# Patient Record
Sex: FEMALE | Race: WHITE | NOT HISPANIC OR LATINO | Employment: OTHER | URBAN - NONMETROPOLITAN AREA
[De-identification: names, ages, dates, MRNs, and addresses within clinical notes are randomized per-mention and may not be internally consistent; named-entity substitution may affect disease eponyms.]

---

## 2018-08-15 RX ORDER — MULTIPLE VITAMINS W/ MINERALS TAB 9MG-400MCG
1 TAB ORAL DAILY
COMMUNITY
End: 2018-08-16

## 2018-08-15 RX ORDER — LANSOPRAZOLE 30 MG/1
30 CAPSULE, DELAYED RELEASE ORAL 2 TIMES DAILY
COMMUNITY
End: 2018-08-16 | Stop reason: ALTCHOICE

## 2018-08-15 RX ORDER — ROSUVASTATIN CALCIUM 20 MG/1
20 TABLET, COATED ORAL DAILY
COMMUNITY

## 2018-08-16 ENCOUNTER — APPOINTMENT (OUTPATIENT)
Dept: LAB | Facility: HOSPITAL | Age: 63
End: 2018-08-16

## 2018-08-16 ENCOUNTER — OFFICE VISIT (OUTPATIENT)
Dept: GASTROENTEROLOGY | Facility: CLINIC | Age: 63
End: 2018-08-16

## 2018-08-16 ENCOUNTER — TELEPHONE (OUTPATIENT)
Dept: GASTROENTEROLOGY | Facility: CLINIC | Age: 63
End: 2018-08-16

## 2018-08-16 VITALS
SYSTOLIC BLOOD PRESSURE: 120 MMHG | OXYGEN SATURATION: 99 % | TEMPERATURE: 97.5 F | HEIGHT: 60 IN | WEIGHT: 143 LBS | HEART RATE: 69 BPM | DIASTOLIC BLOOD PRESSURE: 82 MMHG | BODY MASS INDEX: 28.07 KG/M2

## 2018-08-16 DIAGNOSIS — R10.13 EPIGASTRIC PAIN: Primary | ICD-10-CM

## 2018-08-16 DIAGNOSIS — Z87.19 HISTORY OF PANCREATITIS: ICD-10-CM

## 2018-08-16 LAB
ALBUMIN SERPL-MCNC: 4.3 G/DL (ref 3.5–5)
ALBUMIN/GLOB SERPL: 1.5 G/DL (ref 1.1–2.5)
ALP SERPL-CCNC: 68 U/L (ref 24–120)
ALT SERPL W P-5'-P-CCNC: 40 U/L (ref 0–54)
ANION GAP SERPL CALCULATED.3IONS-SCNC: 8 MMOL/L (ref 4–13)
AST SERPL-CCNC: 28 U/L (ref 7–45)
BASOPHILS # BLD AUTO: 0.04 10*3/MM3 (ref 0–0.2)
BASOPHILS NFR BLD AUTO: 0.5 % (ref 0–2)
BILIRUB SERPL-MCNC: 0.5 MG/DL (ref 0.1–1)
BUN BLD-MCNC: 20 MG/DL (ref 5–21)
BUN/CREAT SERPL: 28.2 (ref 7–25)
CALCIUM SPEC-SCNC: 9.4 MG/DL (ref 8.4–10.4)
CHLORIDE SERPL-SCNC: 104 MMOL/L (ref 98–110)
CO2 SERPL-SCNC: 30 MMOL/L (ref 24–31)
CREAT BLD-MCNC: 0.71 MG/DL (ref 0.5–1.4)
CRP SERPL-MCNC: <0.5 MG/DL (ref 0–0.99)
DEPRECATED RDW RBC AUTO: 42.4 FL (ref 40–54)
EOSINOPHIL # BLD AUTO: 0.16 10*3/MM3 (ref 0–0.7)
EOSINOPHIL NFR BLD AUTO: 2 % (ref 0–4)
ERYTHROCYTE [DISTWIDTH] IN BLOOD BY AUTOMATED COUNT: 12.9 % (ref 12–15)
GFR SERPL CREATININE-BSD FRML MDRD: 83 ML/MIN/1.73
GLOBULIN UR ELPH-MCNC: 2.8 GM/DL
GLUCOSE BLD-MCNC: 91 MG/DL (ref 70–100)
HCT VFR BLD AUTO: 42.4 % (ref 37–47)
HGB BLD-MCNC: 14.8 G/DL (ref 12–16)
IMM GRANULOCYTES # BLD: 0.04 10*3/MM3 (ref 0–0.03)
IMM GRANULOCYTES NFR BLD: 0.5 % (ref 0–5)
LIPASE SERPL-CCNC: 137 U/L (ref 23–203)
LYMPHOCYTES # BLD AUTO: 1.6 10*3/MM3 (ref 0.72–4.86)
LYMPHOCYTES NFR BLD AUTO: 20.1 % (ref 15–45)
MCH RBC QN AUTO: 31.8 PG (ref 28–32)
MCHC RBC AUTO-ENTMCNC: 34.9 G/DL (ref 33–36)
MCV RBC AUTO: 91.2 FL (ref 82–98)
MONOCYTES # BLD AUTO: 0.52 10*3/MM3 (ref 0.19–1.3)
MONOCYTES NFR BLD AUTO: 6.5 % (ref 4–12)
NEUTROPHILS # BLD AUTO: 5.62 10*3/MM3 (ref 1.87–8.4)
NEUTROPHILS NFR BLD AUTO: 70.4 % (ref 39–78)
NRBC BLD MANUAL-RTO: 0 /100 WBC (ref 0–0)
PLATELET # BLD AUTO: 179 10*3/MM3 (ref 130–400)
PMV BLD AUTO: 10.2 FL (ref 6–12)
POTASSIUM BLD-SCNC: 4.3 MMOL/L (ref 3.5–5.3)
PROT SERPL-MCNC: 7.1 G/DL (ref 6.3–8.7)
RBC # BLD AUTO: 4.65 10*6/MM3 (ref 4.2–5.4)
SODIUM BLD-SCNC: 142 MMOL/L (ref 135–145)
WBC NRBC COR # BLD: 7.98 10*3/MM3 (ref 4.8–10.8)

## 2018-08-16 PROCEDURE — 83690 ASSAY OF LIPASE: CPT | Performed by: NURSE PRACTITIONER

## 2018-08-16 PROCEDURE — 36415 COLL VENOUS BLD VENIPUNCTURE: CPT | Performed by: NURSE PRACTITIONER

## 2018-08-16 PROCEDURE — 86140 C-REACTIVE PROTEIN: CPT | Performed by: NURSE PRACTITIONER

## 2018-08-16 PROCEDURE — 80053 COMPREHEN METABOLIC PANEL: CPT | Performed by: NURSE PRACTITIONER

## 2018-08-16 PROCEDURE — 85025 COMPLETE CBC W/AUTO DIFF WBC: CPT | Performed by: NURSE PRACTITIONER

## 2018-08-16 PROCEDURE — 99214 OFFICE O/P EST MOD 30 MIN: CPT | Performed by: NURSE PRACTITIONER

## 2018-08-16 RX ORDER — PHENOL 1.4 %
AEROSOL, SPRAY (ML) MUCOUS MEMBRANE AS NEEDED
COMMUNITY

## 2018-08-16 RX ORDER — OMEPRAZOLE 20 MG/1
20 CAPSULE, DELAYED RELEASE ORAL DAILY
COMMUNITY
End: 2023-02-06 | Stop reason: ALTCHOICE

## 2018-08-16 NOTE — PROGRESS NOTES
"[facility] GASTROENTEROLOGY - OFFICE NOTE    2018    Elva Gorman   1955    Primary Physician: Natali Kenyon MD    Chief Complaint   Patient presents with   • Abdominal Pain         HISTORY OF PRESENT ILLNESS    Elva Gorman is a 63 y.o. female presents  with abdominal pain intermittent x 2 yrs.  Has been constant for 2 weeks.   Located bobby area. Described as a tightness. Radiates to back.  Worse in am's and at night. Can wake up during the night with the pain. Can occur anytime not specifically after eating. Not sure if same pain as with pancreatitis 8 yrs ago.   No n/v. No fever or chills/  No weight loss. Appetite is good. The pain in not exertional. Takes omeprazole daily. Has taken ppi daily for few years. No asa. If has bad headache will take ibuprofen once per month. Has bm daily. No rectal bleeding. \" no more stress than usual\". Steroid injection right shoulder 2 weeks ago. No imaging studies of abdomen.     She had pancreatitis around 8 yr ago and was sent to [facility] for ercp. She was diagnosed with pancreatic divisum.        Last colonoscopy 10/2015  Normal. Recall rec 5 yr.   Last egd 10/2013 normal except gastritis,  Urea neg .    Past Medical History:   Diagnosis Date   • Abnormal liver enzymes    • Arthritis    • Colon polyp    • Common bile duct (CBD) stricture    • Depression    • Elevated CA 19-9 level    • GERD (gastroesophageal reflux disease)    • Hyperlipidemia    • Hypertension    • IBS (irritable bowel syndrome)    • Nephrolithiasis    • Osteopenia    • Pancreatic divisum        Past Surgical History:   Procedure Laterality Date   • APPENDECTOMY     •  SECTION     • CHOLECYSTECTOMY     • COLONOSCOPY  10/15/2015    normal   • ENDOSCOPY  10/21/2013    mild gastritis   • HYSTERECTOMY     • BAILEY'S NEUROMA EXCISION     • PACEMAKER IMPLANTATION     • TONSILLECTOMY         Outpatient Prescriptions Marked as Taking for the 18 encounter (Office Visit) with " "Lisbeth Lanier, LEONARDO   Medication Sig Dispense Refill   • CALCIUM PO Take  by mouth 2 (Two) Times a Day.     • Cholecalciferol (VITAMIN D3 PO) Take  by mouth Daily.     • Denosumab (PROLIA SC) Inject  under the skin into the appropriate area as directed Every 6 (Six) Months.     • Melatonin 10 MG tablet Take  by mouth As Needed.     • omeprazole (priLOSEC) 20 MG capsule Take 20 mg by mouth Daily.     • rosuvastatin (CRESTOR) 20 MG tablet Take 20 mg by mouth Daily.         Allergies   Allergen Reactions   • Celexa [Citalopram Hydrobromide] Other (See Comments)     flushing   • Codeine Other (See Comments)     Heartburn \"chest pain\"       Social History     Social History   • Marital status:      Spouse name: N/A   • Number of children: N/A   • Years of education: N/A     Occupational History   • Not on file.     Social History Main Topics   • Smoking status: Never Smoker   • Smokeless tobacco: Never Used   • Alcohol use No   • Drug use: No   • Sexual activity: Defer     Other Topics Concern   • Not on file     Social History Narrative   • No narrative on file       Family History   Problem Relation Age of Onset   • Colon cancer Father        Review of Systems   Constitutional: Negative for appetite change, chills, fatigue, fever and unexpected weight change.   HENT: Negative for sore throat and trouble swallowing.    Eyes: Negative for visual disturbance.   Respiratory: Negative for cough, chest tightness, shortness of breath and wheezing.    Cardiovascular: Negative for chest pain and palpitations.   Gastrointestinal: Positive for abdominal pain. Negative for abdominal distention, anal bleeding, blood in stool, constipation, diarrhea, nausea, rectal pain and vomiting.        As mentioned in hpi   Genitourinary: Negative for difficulty urinating and hematuria.   Musculoskeletal: Negative for arthralgias and back pain.   Skin: Negative for color change and rash.   Neurological: Negative for dizziness, seizures, " "syncope, light-headedness and headaches.   Hematological: Negative for adenopathy.   Psychiatric/Behavioral: Negative for confusion. The patient is not nervous/anxious.         Vitals:    08/16/18 0846   BP: 120/82   BP Location: Left arm   Patient Position: Sitting   Cuff Size: Adult   Pulse: 69   Temp: 97.5 °F (36.4 °C)   SpO2: 99%   Weight: 64.9 kg (143 lb)   Height: 152.4 cm (60\")      Body mass index is 27.93 kg/m².    Physical Exam   Constitutional: She appears well-developed and well-nourished. No distress.   HENT:   Head: Normocephalic and atraumatic.   Eyes: EOM are normal. No scleral icterus.   Neck: Neck supple. No JVD present.   Cardiovascular: Normal rate, regular rhythm and normal heart sounds.    Pulmonary/Chest: Effort normal and breath sounds normal. No stridor.   Abdominal: Soft. Bowel sounds are normal. She exhibits no distension and no mass. There is no tenderness. There is no rebound and no guarding.   Musculoskeletal: Normal range of motion. She exhibits no deformity.   Neurological: She is alert.   Skin: Skin is warm and dry. No rash noted.   Psychiatric: She has a normal mood and affect. Her behavior is normal.   Vitals reviewed.              ASSESSMENT AND PLAN    Assessment/Plan     Elva was seen today for abdominal pain.    Diagnoses and all orders for this visit:    Epigastric pain  -     Lipase  -     CBC & Differential  -     Comprehensive Metabolic Panel  -     Case Request; Standing  -     Case Request  -     C-reactive Protein    History of pancreatitis  -     C-reactive Protein    Other orders  -     Follow Anesthesia Guidelines / Standing Orders; Future  -     Implement Anesthesia Orders Day of Procedure; Standing  -     Obtain Informed Consent; Standing    in regards to bobby pain, differential diagnoses discussed. Recommend egd and she is agreeable. Will also check labs.  Recommend er if worsening symptoms.     ESOPHAGOGASTRODUODENOSCOPY WITH ANESTHESIA (N/A)   Risk, benefits, " and alternatives of endoscopy were explained in full.  They understand that there is a risk of bleeding, perforation, and infection.  The risk of perforation goes up with esophageal dilation.  Other options to evaluate UGI complaints could involve barium swallow or UGI series, but these would be diagnostic tests only.  Patient was given time to ask questions.  I answered them to their satisfaction and they are agreeable to proceeding         Body mass index is 27.93 kg/m².    Patient's Body mass index is 27.93 kg/m². BMI is within normal parameters. No follow-up required.           LEONARDO Rausch    EMR Dragon/transcription disclaimer:  Much of this encounter note is electronic transcription/translation of spoken language to printed text.  The electronic translation of spoken language may be erroneous, or at times, nonsensical words or phrases may be inadvertently transcribed.  Although I have reviewed the note for such errors, some may still exist.

## 2018-08-17 ENCOUNTER — ANESTHESIA EVENT (OUTPATIENT)
Dept: GASTROENTEROLOGY | Facility: HOSPITAL | Age: 63
End: 2018-08-17

## 2018-08-17 ENCOUNTER — HOSPITAL ENCOUNTER (OUTPATIENT)
Facility: HOSPITAL | Age: 63
Setting detail: HOSPITAL OUTPATIENT SURGERY
Discharge: HOME | End: 2018-08-17
Attending: INTERNAL MEDICINE | Admitting: INTERNAL MEDICINE

## 2018-08-17 ENCOUNTER — TELEPHONE (OUTPATIENT)
Dept: GASTROENTEROLOGY | Facility: CLINIC | Age: 63
End: 2018-08-17

## 2018-08-17 ENCOUNTER — ANESTHESIA (OUTPATIENT)
Dept: GASTROENTEROLOGY | Facility: HOSPITAL | Age: 63
End: 2018-08-17

## 2018-08-17 ENCOUNTER — TRANSCRIBE ORDERS (OUTPATIENT)
Dept: GASTROENTEROLOGY | Facility: CLINIC | Age: 63
End: 2018-08-17

## 2018-08-17 VITALS
RESPIRATION RATE: 14 BRPM | OXYGEN SATURATION: 100 % | SYSTOLIC BLOOD PRESSURE: 121 MMHG | DIASTOLIC BLOOD PRESSURE: 61 MMHG | HEART RATE: 72 BPM | WEIGHT: 142.2 LBS | HEIGHT: 60 IN | BODY MASS INDEX: 27.92 KG/M2 | TEMPERATURE: 97.5 F

## 2018-08-17 DIAGNOSIS — R10.13 EPIGASTRIC PAIN: ICD-10-CM

## 2018-08-17 DIAGNOSIS — R10.9 PAIN IN THE ABDOMEN: Primary | ICD-10-CM

## 2018-08-17 PROCEDURE — 25010000002 PROPOFOL 10 MG/ML EMULSION: Performed by: NURSE ANESTHETIST, CERTIFIED REGISTERED

## 2018-08-17 PROCEDURE — 88305 TISSUE EXAM BY PATHOLOGIST: CPT | Performed by: INTERNAL MEDICINE

## 2018-08-17 PROCEDURE — 43239 EGD BIOPSY SINGLE/MULTIPLE: CPT | Performed by: INTERNAL MEDICINE

## 2018-08-17 PROCEDURE — 87081 CULTURE SCREEN ONLY: CPT | Performed by: INTERNAL MEDICINE

## 2018-08-17 RX ORDER — SUCRALFATE 1 G/1
1 TABLET ORAL 4 TIMES DAILY
Qty: 120 TABLET | Refills: 3 | Status: SHIPPED | OUTPATIENT
Start: 2018-08-17

## 2018-08-17 RX ORDER — LIDOCAINE HYDROCHLORIDE 20 MG/ML
INJECTION, SOLUTION INFILTRATION; PERINEURAL AS NEEDED
Status: DISCONTINUED | OUTPATIENT
Start: 2018-08-17 | End: 2018-08-17 | Stop reason: SURG

## 2018-08-17 RX ORDER — SODIUM CHLORIDE 0.9 % (FLUSH) 0.9 %
3 SYRINGE (ML) INJECTION AS NEEDED
Status: DISCONTINUED | OUTPATIENT
Start: 2018-08-17 | End: 2018-08-17 | Stop reason: HOSPADM

## 2018-08-17 RX ORDER — ONDANSETRON 2 MG/ML
4 INJECTION INTRAMUSCULAR; INTRAVENOUS ONCE AS NEEDED
Status: DISCONTINUED | OUTPATIENT
Start: 2018-08-17 | End: 2018-08-17 | Stop reason: HOSPADM

## 2018-08-17 RX ORDER — PROPOFOL 10 MG/ML
VIAL (ML) INTRAVENOUS AS NEEDED
Status: DISCONTINUED | OUTPATIENT
Start: 2018-08-17 | End: 2018-08-17 | Stop reason: SURG

## 2018-08-17 RX ORDER — SODIUM CHLORIDE 9 MG/ML
500 INJECTION, SOLUTION INTRAVENOUS CONTINUOUS PRN
Status: DISCONTINUED | OUTPATIENT
Start: 2018-08-17 | End: 2018-08-17 | Stop reason: HOSPADM

## 2018-08-17 RX ADMIN — LIDOCAINE HYDROCHLORIDE 100 MG: 20 INJECTION, SOLUTION INFILTRATION; PERINEURAL at 13:25

## 2018-08-17 RX ADMIN — LIDOCAINE HYDROCHLORIDE 0.5 ML: 10 INJECTION, SOLUTION EPIDURAL; INFILTRATION; INTRACAUDAL; PERINEURAL at 10:55

## 2018-08-17 RX ADMIN — PROPOFOL 40 MG: 10 INJECTION, EMULSION INTRAVENOUS at 13:25

## 2018-08-17 RX ADMIN — LIDOCAINE HYDROCHLORIDE 100 MG: 20 INJECTION, SOLUTION INFILTRATION; PERINEURAL at 13:23

## 2018-08-17 RX ADMIN — PROPOFOL 60 MG: 10 INJECTION, EMULSION INTRAVENOUS at 13:23

## 2018-08-17 RX ADMIN — SODIUM CHLORIDE 500 ML: 9 INJECTION, SOLUTION INTRAVENOUS at 10:55

## 2018-08-17 NOTE — H&P (VIEW-ONLY)
"[facility] GASTROENTEROLOGY - OFFICE NOTE    2018    Elva Gorman   1955    Primary Physician: Natali Kenyon MD    Chief Complaint   Patient presents with   • Abdominal Pain         HISTORY OF PRESENT ILLNESS    Elva Gorman is a 63 y.o. female presents  with abdominal pain intermittent x 2 yrs.  Has been constant for 2 weeks.   Located bobby area. Described as a tightness. Radiates to back.  Worse in am's and at night. Can wake up during the night with the pain. Can occur anytime not specifically after eating. Not sure if same pain as with pancreatitis 8 yrs ago.   No n/v. No fever or chills/  No weight loss. Appetite is good. The pain in not exertional. Takes omeprazole daily. Has taken ppi daily for few years. No asa. If has bad headache will take ibuprofen once per month. Has bm daily. No rectal bleeding. \" no more stress than usual\". Steroid injection right shoulder 2 weeks ago. No imaging studies of abdomen.     She had pancreatitis around 8 yr ago and was sent to [facility] for ercp. She was diagnosed with pancreatic divisum.        Last colonoscopy 10/2015  Normal. Recall rec 5 yr.   Last egd 10/2013 normal except gastritis,  Urea neg .    Past Medical History:   Diagnosis Date   • Abnormal liver enzymes    • Arthritis    • Colon polyp    • Common bile duct (CBD) stricture    • Depression    • Elevated CA 19-9 level    • GERD (gastroesophageal reflux disease)    • Hyperlipidemia    • Hypertension    • IBS (irritable bowel syndrome)    • Nephrolithiasis    • Osteopenia    • Pancreatic divisum        Past Surgical History:   Procedure Laterality Date   • APPENDECTOMY     •  SECTION     • CHOLECYSTECTOMY     • COLONOSCOPY  10/15/2015    normal   • ENDOSCOPY  10/21/2013    mild gastritis   • HYSTERECTOMY     • BAILEY'S NEUROMA EXCISION     • PACEMAKER IMPLANTATION     • TONSILLECTOMY         Outpatient Prescriptions Marked as Taking for the 18 encounter (Office Visit) with " "Lisbeth Lanier, LEONARDO   Medication Sig Dispense Refill   • CALCIUM PO Take  by mouth 2 (Two) Times a Day.     • Cholecalciferol (VITAMIN D3 PO) Take  by mouth Daily.     • Denosumab (PROLIA SC) Inject  under the skin into the appropriate area as directed Every 6 (Six) Months.     • Melatonin 10 MG tablet Take  by mouth As Needed.     • omeprazole (priLOSEC) 20 MG capsule Take 20 mg by mouth Daily.     • rosuvastatin (CRESTOR) 20 MG tablet Take 20 mg by mouth Daily.         Allergies   Allergen Reactions   • Celexa [Citalopram Hydrobromide] Other (See Comments)     flushing   • Codeine Other (See Comments)     Heartburn \"chest pain\"       Social History     Social History   • Marital status:      Spouse name: N/A   • Number of children: N/A   • Years of education: N/A     Occupational History   • Not on file.     Social History Main Topics   • Smoking status: Never Smoker   • Smokeless tobacco: Never Used   • Alcohol use No   • Drug use: No   • Sexual activity: Defer     Other Topics Concern   • Not on file     Social History Narrative   • No narrative on file       Family History   Problem Relation Age of Onset   • Colon cancer Father        Review of Systems   Constitutional: Negative for appetite change, chills, fatigue, fever and unexpected weight change.   HENT: Negative for sore throat and trouble swallowing.    Eyes: Negative for visual disturbance.   Respiratory: Negative for cough, chest tightness, shortness of breath and wheezing.    Cardiovascular: Negative for chest pain and palpitations.   Gastrointestinal: Positive for abdominal pain. Negative for abdominal distention, anal bleeding, blood in stool, constipation, diarrhea, nausea, rectal pain and vomiting.        As mentioned in hpi   Genitourinary: Negative for difficulty urinating and hematuria.   Musculoskeletal: Negative for arthralgias and back pain.   Skin: Negative for color change and rash.   Neurological: Negative for dizziness, seizures, " "syncope, light-headedness and headaches.   Hematological: Negative for adenopathy.   Psychiatric/Behavioral: Negative for confusion. The patient is not nervous/anxious.         Vitals:    08/16/18 0846   BP: 120/82   BP Location: Left arm   Patient Position: Sitting   Cuff Size: Adult   Pulse: 69   Temp: 97.5 °F (36.4 °C)   SpO2: 99%   Weight: 64.9 kg (143 lb)   Height: 152.4 cm (60\")      Body mass index is 27.93 kg/m².    Physical Exam   Constitutional: She appears well-developed and well-nourished. No distress.   HENT:   Head: Normocephalic and atraumatic.   Eyes: EOM are normal. No scleral icterus.   Neck: Neck supple. No JVD present.   Cardiovascular: Normal rate, regular rhythm and normal heart sounds.    Pulmonary/Chest: Effort normal and breath sounds normal. No stridor.   Abdominal: Soft. Bowel sounds are normal. She exhibits no distension and no mass. There is no tenderness. There is no rebound and no guarding.   Musculoskeletal: Normal range of motion. She exhibits no deformity.   Neurological: She is alert.   Skin: Skin is warm and dry. No rash noted.   Psychiatric: She has a normal mood and affect. Her behavior is normal.   Vitals reviewed.              ASSESSMENT AND PLAN    Assessment/Plan     Elva was seen today for abdominal pain.    Diagnoses and all orders for this visit:    Epigastric pain  -     Lipase  -     CBC & Differential  -     Comprehensive Metabolic Panel  -     Case Request; Standing  -     Case Request  -     C-reactive Protein    History of pancreatitis  -     C-reactive Protein    Other orders  -     Follow Anesthesia Guidelines / Standing Orders; Future  -     Implement Anesthesia Orders Day of Procedure; Standing  -     Obtain Informed Consent; Standing    in regards to bobby pain, differential diagnoses discussed. Recommend egd and she is agreeable. Will also check labs.  Recommend er if worsening symptoms.     ESOPHAGOGASTRODUODENOSCOPY WITH ANESTHESIA (N/A)   Risk, benefits, " and alternatives of endoscopy were explained in full.  They understand that there is a risk of bleeding, perforation, and infection.  The risk of perforation goes up with esophageal dilation.  Other options to evaluate UGI complaints could involve barium swallow or UGI series, but these would be diagnostic tests only.  Patient was given time to ask questions.  I answered them to their satisfaction and they are agreeable to proceeding         Body mass index is 27.93 kg/m².    Patient's Body mass index is 27.93 kg/m². BMI is within normal parameters. No follow-up required.           LEONARDO Rausch    EMR Dragon/transcription disclaimer:  Much of this encounter note is electronic transcription/translation of spoken language to printed text.  The electronic translation of spoken language may be erroneous, or at times, nonsensical words or phrases may be inadvertently transcribed.  Although I have reviewed the note for such errors, some may still exist.

## 2018-08-17 NOTE — TELEPHONE ENCOUNTER
She needs a CT scan of her abdomen and pelvis with contrast, by mouth and IV.  Please arrange for this.  It is regarding unexplained abdominal pain

## 2018-08-17 NOTE — ANESTHESIA PREPROCEDURE EVALUATION
Anesthesia Evaluation     Patient summary reviewed   no history of anesthetic complications:  NPO Solid Status: > 8 hours             Airway   Mallampati: I  TM distance: >3 FB  Neck ROM: full  Dental      Pulmonary - negative pulmonary ROS   Cardiovascular   Exercise tolerance: excellent (>7 METS)    (+) pacemaker (medtronic) pacemaker interrogated 1-3 months ago, hyperlipidemia,       Neuro/Psych- negative ROS  GI/Hepatic/Renal/Endo    (+)  GERD,      Musculoskeletal     Abdominal    Substance History      OB/GYN          Other                        Anesthesia Plan    ASA 3     general     intravenous induction   Anesthetic plan and risks discussed with patient.

## 2018-08-17 NOTE — ANESTHESIA POSTPROCEDURE EVALUATION
"Patient: Elva Gorman    Procedure Summary     Date:  08/17/18 Room / Location:  [facility] ENDOSCOPY 6 / [facility] ENDOSCOPY    Anesthesia Start:  1318 Anesthesia Stop:  1332    Procedure:  ESOPHAGOGASTRODUODENOSCOPY WITH ANESTHESIA (N/A ) Diagnosis:       Epigastric pain      (Epigastric pain [R10.13])    Surgeon:  Edd Gómez MD Provider:  Edmundo Valenzuela CRNA    Anesthesia Type:  general ASA Status:  3          Anesthesia Type: general  Last vitals  BP   150/93 (08/17/18 1044)   Temp   97.5 °F (36.4 °C) (08/17/18 1044)   Pulse   70 (08/17/18 1044)   Resp   18 (08/17/18 1044)     SpO2   99 % (08/17/18 1044)     Post Anesthesia Care and Evaluation    Patient location during evaluation: PHASE II  Patient participation: complete - patient participated  Level of consciousness: awake  Pain management: adequate  Airway patency: patent  Anesthetic complications: No anesthetic complications  PONV Status: none  Cardiovascular status: acceptable  Respiratory status: acceptable  Hydration status: acceptable    Comments: Blood pressure 150/93, pulse 70, temperature 97.5 °F (36.4 °C), temperature source Temporal Artery , resp. rate 18, height 152.4 cm (60\"), weight 64.5 kg (142 lb 3.2 oz), SpO2 99 %, not currently breastfeeding.        "

## 2018-08-18 LAB — UREASE TISS QL: NEGATIVE

## 2018-08-20 LAB
CYTO UR: NORMAL
LAB AP CASE REPORT: NORMAL
PATH REPORT.FINAL DX SPEC: NORMAL
PATH REPORT.GROSS SPEC: NORMAL

## 2018-08-27 ENCOUNTER — TELEPHONE (OUTPATIENT)
Dept: GASTROENTEROLOGY | Facility: CLINIC | Age: 63
End: 2018-08-27

## 2018-08-27 NOTE — TELEPHONE ENCOUNTER
Tue 08/28/18 09:45 AM [facility] CT BIC PAD BIC CT 1 CT PAD ABD PELVIS W CONTRAST Scheduled     Will f/u tomorrow to see if patient completed test.

## 2018-08-28 ENCOUNTER — HOSPITAL ENCOUNTER (OUTPATIENT)
Dept: CT IMAGING | Facility: HOSPITAL | Age: 63
Discharge: HOME | End: 2018-08-28
Attending: INTERNAL MEDICINE | Admitting: INTERNAL MEDICINE

## 2018-08-28 DIAGNOSIS — R10.9 PAIN IN THE ABDOMEN: ICD-10-CM

## 2018-08-28 LAB — CREAT BLDA-MCNC: 0.8 MG/DL (ref 0.6–1.3)

## 2018-08-28 PROCEDURE — 25010000002 IOPAMIDOL 61 % SOLUTION: Performed by: INTERNAL MEDICINE

## 2018-08-28 PROCEDURE — 82565 ASSAY OF CREATININE: CPT

## 2018-08-28 PROCEDURE — 0 IOHEXOL 300 MG/ML SOLUTION: Performed by: INTERNAL MEDICINE

## 2018-08-28 PROCEDURE — 74177 CT ABD & PELVIS W/CONTRAST: CPT

## 2018-08-28 RX ADMIN — IOPAMIDOL 100 ML: 612 INJECTION, SOLUTION INTRAVENOUS at 09:50

## 2018-08-28 RX ADMIN — IOHEXOL 50 ML: 300 INJECTION, SOLUTION INTRAVENOUS at 09:50

## 2018-09-25 ENCOUNTER — OFFICE VISIT (OUTPATIENT)
Dept: GASTROENTEROLOGY | Facility: CLINIC | Age: 63
End: 2018-09-25

## 2018-09-25 VITALS
BODY MASS INDEX: 28.27 KG/M2 | OXYGEN SATURATION: 99 % | SYSTOLIC BLOOD PRESSURE: 106 MMHG | TEMPERATURE: 96.5 F | HEART RATE: 66 BPM | DIASTOLIC BLOOD PRESSURE: 74 MMHG | HEIGHT: 60 IN | WEIGHT: 144 LBS

## 2018-09-25 DIAGNOSIS — R10.13 EPIGASTRIC PAIN: Primary | ICD-10-CM

## 2018-09-25 PROCEDURE — 99212 OFFICE O/P EST SF 10 MIN: CPT | Performed by: NURSE PRACTITIONER

## 2018-09-25 NOTE — PROGRESS NOTES
"[facility] GASTROENTEROLOGY - OFFICE NOTE    9/25/2018    Elva Gorman   1955    Primary Physician: Natali Kenyon MD    Chief Complaint   Patient presents with   • Abdominal Pain         HISTORY OF PRESENT ILLNESS    Elva Gorman is a 63 y.o. female presents for f/u abdominal pain. She is doing much better. Her only c/o today is pain in her ribs on right side for the last 2 months. She is going to talk with her pcp about the rib pain. Took carafate for 1 month after egd done 8-17-18. Did not think carafate helped so stopped. However better now. On prilosec daily. No other gi complaints today.      Had egd done 8-17-18 few gastric polyps ( path benign fundic gland polyp), nothing found to account for her symptoms,  h pylori negative. She was placed on a trial of carafate and ct abd/pelvis done which was ok except suggestion of anatomic variation of lumbosacral spine. Also had labs that were ok as well.         Ov  8-16-18 Elva Gorman is a 63 y.o. female presents  with abdominal pain intermittent x 2 yrs.  Has been constant for 2 weeks.   Located bobby area. Described as a tightness. Radiates to back.  Worse in am's and at night. Can wake up during the night with the pain. Can occur anytime not specifically after eating. Not sure if same pain as with pancreatitis 8 yrs ago.   No n/v. No fever or chills/  No weight loss. Appetite is good. The pain in not exertional. Takes omeprazole daily. Has taken ppi daily for few years. No asa. If has bad headache will take ibuprofen once per month. Has bm daily. No rectal bleeding. \" no more stress than usual\". Steroid injection right shoulder 2 weeks ago. No imaging studies of abdomen.      She had pancreatitis around 8 yr ago and was sent to [place] for ercp. She was diagnosed with pancreatic divisum.          Last colonoscopy 10/2015  Normal. Recall rec 5 yr.   Last egd 10/2013 normal except gastritis,  Urea neg.       Past Medical History:   Diagnosis " "Date   • Abnormal liver enzymes    • Arthritis    • Colon polyp    • Common bile duct (CBD) stricture    • Depression    • Elevated CA 19-9 level    • GERD (gastroesophageal reflux disease)    • Hyperlipidemia    • IBS (irritable bowel syndrome)    • Nephrolithiasis    • Osteopenia    • Pancreatic divisum    • PONV (postoperative nausea and vomiting)    • Spinal headache        Past Surgical History:   Procedure Laterality Date   • APPENDECTOMY     •  SECTION     • CHOLECYSTECTOMY     • COLONOSCOPY  10/15/2015    normal   • ENDOSCOPY  10/21/2013    mild gastritis   • ENDOSCOPY N/A 2018    Procedure: ESOPHAGOGASTRODUODENOSCOPY WITH ANESTHESIA;  Surgeon: Edd Gómez MD;  Location: [facility] ENDOSCOPY;  Service: Gastroenterology   • HYSTERECTOMY     • BAILEY'S NEUROMA EXCISION     • PACEMAKER IMPLANTATION     • TONSILLECTOMY         Outpatient Prescriptions Marked as Taking for the 18 encounter (Office Visit) with Lisbeth Lanier APRN   Medication Sig Dispense Refill   • CALCIUM PO Take  by mouth 2 (Two) Times a Day.     • Cholecalciferol (VITAMIN D3 PO) Take  by mouth Daily.     • Denosumab (PROLIA SC) Inject  under the skin into the appropriate area as directed Every 6 (Six) Months.     • Melatonin 10 MG tablet Take  by mouth As Needed.     • omeprazole (priLOSEC) 20 MG capsule Take 20 mg by mouth Daily.     • rosuvastatin (CRESTOR) 20 MG tablet Take 20 mg by mouth Daily.         Allergies   Allergen Reactions   • Celexa [Citalopram Hydrobromide] Other (See Comments)     flushing   • Codeine Other (See Comments)     Heartburn \"chest pain\"       Social History     Social History   • Marital status:      Spouse name: N/A   • Number of children: N/A   • Years of education: N/A     Occupational History   • Not on file.     Social History Main Topics   • Smoking status: Never Smoker   • Smokeless tobacco: Never Used   • Alcohol use No   • Drug use: No   • Sexual activity: Defer     Other " "Topics Concern   • Not on file     Social History Narrative   • No narrative on file       Family History   Problem Relation Age of Onset   • Colon cancer Father    • Lung cancer Father    • Breast cancer Mother        Review of Systems   Constitutional: Negative for chills and fever.   Respiratory: Negative for cough, shortness of breath and wheezing.    Cardiovascular: Negative for chest pain and palpitations.   Gastrointestinal: Negative for abdominal distention, abdominal pain, anal bleeding, blood in stool, constipation, diarrhea, nausea, rectal pain and vomiting.        Vitals:    09/25/18 0928   BP: 106/74   BP Location: Left arm   Patient Position: Sitting   Cuff Size: Adult   Pulse: 66   Temp: 96.5 °F (35.8 °C)   SpO2: 99%   Weight: 65.3 kg (144 lb)   Height: 152.4 cm (60\")      Body mass index is 28.12 kg/m².    Physical Exam    Results for orders placed or performed during the hospital encounter of 08/28/18   POC Creatinine   Result Value Ref Range    Creatinine 0.80 0.60 - 1.30 mg/dL           ASSESSMENT AND PLAN    Assessment/Plan     Elva was seen today for abdominal pain.    Diagnoses and all orders for this visit:    Epigastric pain    she is doing much better. No further pain . egd was ok. Ct abd was ok. Labs were all ok. Recommend continue ppi daily. Her stress level is much better since had these test and all looked good. If has recurrence then we discussed pursuing colonoscopy. F/u ov prn.          Body mass index is 28.12 kg/m².    Patient's Body mass index is 28.12 kg/m². BMI is within normal parameters. No follow-up required.         LEONARDO Rausch    EMR Dragon/transcription disclaimer:  Much of this encounter note is electronic transcription/translation of spoken language to printed text.  The electronic translation of spoken language may be erroneous, or at times, nonsensical words or phrases may be inadvertently transcribed.  Although I have reviewed the note for such errors, some " may still exist.

## 2019-12-27 ENCOUNTER — OFFICE VISIT (OUTPATIENT)
Dept: FAMILY MEDICINE CLINIC | Facility: CLINIC | Age: 64
End: 2019-12-27

## 2019-12-27 VITALS
OXYGEN SATURATION: 100 % | TEMPERATURE: 100.8 F | RESPIRATION RATE: 17 BRPM | WEIGHT: 144 LBS | SYSTOLIC BLOOD PRESSURE: 106 MMHG | HEART RATE: 90 BPM | HEIGHT: 60 IN | DIASTOLIC BLOOD PRESSURE: 80 MMHG | BODY MASS INDEX: 28.27 KG/M2

## 2019-12-27 DIAGNOSIS — J11.1 INFLUENZA-LIKE ILLNESS: ICD-10-CM

## 2019-12-27 DIAGNOSIS — J06.9 ACUTE URI: Primary | ICD-10-CM

## 2019-12-27 LAB
EXPIRATION DATE: NORMAL
FLUAV AG NPH QL: NEGATIVE
FLUBV AG NPH QL: NEGATIVE
INTERNAL CONTROL: NORMAL
Lab: NORMAL

## 2019-12-27 PROCEDURE — 99203 OFFICE O/P NEW LOW 30 MIN: CPT | Performed by: NURSE PRACTITIONER

## 2019-12-27 PROCEDURE — 87804 INFLUENZA ASSAY W/OPTIC: CPT | Performed by: NURSE PRACTITIONER

## 2019-12-27 RX ORDER — OSELTAMIVIR PHOSPHATE 75 MG/1
75 CAPSULE ORAL 2 TIMES DAILY
Qty: 10 CAPSULE | Refills: 0 | Status: ON HOLD | OUTPATIENT
Start: 2019-12-27 | End: 2020-09-29

## 2019-12-27 NOTE — PROGRESS NOTES
Subjective   Elva Gorman is a 64 y.o. female.     Influenza   This is a new problem. The current episode started in the past 7 days (2 days ago). The problem occurs constantly. The problem has been gradually worsening. Associated symptoms include chills, congestion, coughing, fatigue, a fever, headaches, myalgias, nausea and vertigo. Pertinent negatives include no anorexia, sore throat or vomiting. Nothing aggravates the symptoms. She has tried NSAIDs for the symptoms. The treatment provided no relief.        The following portions of the patient's history were reviewed and updated as appropriate: allergies, current medications, past family history, past medical history, past social history, past surgical history and problem list.    Review of Systems   Constitutional: Positive for activity change, chills, fatigue and fever.   HENT: Positive for congestion, postnasal drip and rhinorrhea. Negative for sore throat.    Respiratory: Positive for cough and wheezing. Negative for shortness of breath.    Gastrointestinal: Positive for nausea. Negative for anorexia and vomiting.   Musculoskeletal: Positive for myalgias.   Neurological: Positive for vertigo and headaches.       Objective     Vitals:    12/27/19 1045   BP: 106/80   Pulse: 90   Resp: 17   Temp: (!) 100.8 °F (38.2 °C)   SpO2: 100%       Physical Exam   Constitutional: She appears well-developed and well-nourished. She appears ill. No distress.   HENT:   Right Ear: Tympanic membrane and ear canal normal.   Left Ear: Tympanic membrane and ear canal normal.   Nose: Mucosal edema and rhinorrhea present. Right sinus exhibits no maxillary sinus tenderness and no frontal sinus tenderness. Left sinus exhibits no maxillary sinus tenderness and no frontal sinus tenderness.   Mouth/Throat: Uvula is midline and oropharynx is clear and moist. No uvula swelling.   Eyes: Conjunctivae are normal.   Neck: Neck supple. No tracheal deviation present. No thyromegaly present.    Cardiovascular: Normal rate, regular rhythm and normal heart sounds.   Pulmonary/Chest: Effort normal and breath sounds normal.   Lymphadenopathy:     She has no cervical adenopathy.   Neurological: She is alert.   Skin: Skin is warm and dry.   Psychiatric: She has a normal mood and affect. Her behavior is normal.   Nursing note and vitals reviewed.         Patient's Body mass index is 28.12 kg/m². BMI is above normal parameters. Recommendations include: none (medical contraindication) (acutely ill).       Assessment/Plan   Elva was seen today for influenza and fever.    Diagnoses and all orders for this visit:    Acute URI  -     POCT Influenza A/B    Influenza-like illness    Other orders  -     oseltamivir (TAMIFLU) 75 MG capsule; Take 1 capsule by mouth 2 (Two) Times a Day.          Return in about 1 week (around 1/3/2020).             Electronically signed by LEONARDO Huffman, 12/27/19, 11:00 AM.

## 2020-09-01 ENCOUNTER — OFFICE VISIT (OUTPATIENT)
Dept: GASTROENTEROLOGY | Facility: CLINIC | Age: 65
End: 2020-09-01

## 2020-09-01 VITALS
OXYGEN SATURATION: 98 % | HEART RATE: 81 BPM | SYSTOLIC BLOOD PRESSURE: 126 MMHG | WEIGHT: 139 LBS | BODY MASS INDEX: 27.29 KG/M2 | HEIGHT: 60 IN | TEMPERATURE: 96.8 F | DIASTOLIC BLOOD PRESSURE: 80 MMHG

## 2020-09-01 DIAGNOSIS — Z86.010 HX OF COLONIC POLYP: ICD-10-CM

## 2020-09-01 DIAGNOSIS — Z80.0 FAMILY HX OF COLON CANCER: Primary | ICD-10-CM

## 2020-09-01 PROBLEM — Z86.0100 HX OF COLONIC POLYP: Status: ACTIVE | Noted: 2020-09-01

## 2020-09-01 PROCEDURE — S0260 H&P FOR SURGERY: HCPCS | Performed by: NURSE PRACTITIONER

## 2020-09-01 NOTE — PROGRESS NOTES
[facility] Gastroenterology    Primary Physician Natali Kenyon MD    2020    Elva Gorman   1955      Chief Complaint   Patient presents with   • Colonoscopy       Subjective     HPI    Elva Gorman is a 65 y.o. female who presents as a referral for preventative maintenance. She has no complaints of nausea or vomiting. No change in bowels. No wt loss. No BRBPR. No melena. No abdominal pain.        Last colonoscopy was 10/2015 normal.  The patient does have history of colon polyps. The patient does no have history of colon cancer.   There is not a family history of colon polyps. There is family history of colon cancer father.     Past Medical History:   Diagnosis Date   • Abnormal liver enzymes    • Arthritis    • Colon polyp    • Common bile duct (CBD) stricture    • Depression    • Elevated CA 19-9 level    • GERD (gastroesophageal reflux disease)    • Hyperlipidemia    • IBS (irritable bowel syndrome)    • Nephrolithiasis    • Osteopenia    • Pancreatic divisum    • PONV (postoperative nausea and vomiting)    • Spinal headache        Past Surgical History:   Procedure Laterality Date   • APPENDECTOMY     •  SECTION     • CHOLECYSTECTOMY     • COLONOSCOPY  10/15/2015    normal   • ENDOSCOPY  10/21/2013    mild gastritis   • ENDOSCOPY N/A 2018    Procedure: ESOPHAGOGASTRODUODENOSCOPY WITH ANESTHESIA;  Surgeon: Edd Gómez MD;  Location: [facility] ENDOSCOPY;  Service: Gastroenterology   • HYSTERECTOMY     • BAILEY'S NEUROMA EXCISION     • PACEMAKER IMPLANTATION     • TONSILLECTOMY         Outpatient Medications Marked as Taking for the 20 encounter (Office Visit) with Lisbeth Lanier APRN   Medication Sig Dispense Refill   • CALCIUM PO Take  by mouth 2 (Two) Times a Day.     • Cholecalciferol (VITAMIN D3 PO) Take  by mouth Daily.     • Denosumab (PROLIA SC) Inject  under the skin into the appropriate area as directed Every 6 (Six) Months.     • Melatonin 10 MG tablet  "Take  by mouth As Needed.     • omeprazole (priLOSEC) 20 MG capsule Take 20 mg by mouth Daily.     • rosuvastatin (CRESTOR) 20 MG tablet Take 20 mg by mouth Daily.         Allergies   Allergen Reactions   • Celexa [Citalopram Hydrobromide] Other (See Comments)     flushing   • Codeine Other (See Comments)     Heartburn \"chest pain\"       Social History     Socioeconomic History   • Marital status:      Spouse name: Not on file   • Number of children: Not on file   • Years of education: Not on file   • Highest education level: Not on file   Tobacco Use   • Smoking status: Never Smoker   • Smokeless tobacco: Never Used   Substance and Sexual Activity   • Alcohol use: No   • Drug use: No   • Sexual activity: Defer       Family History   Problem Relation Age of Onset   • Colon cancer Father    • Lung cancer Father    • Breast cancer Mother        Review of Systems   Constitutional: Negative for appetite change, chills, fatigue, fever and unexpected weight change.   HENT: Negative for sore throat and trouble swallowing.    Eyes: Negative for visual disturbance.   Respiratory: Negative for cough, shortness of breath and wheezing.    Cardiovascular: Negative for chest pain and palpitations.   Gastrointestinal: Negative for abdominal distention, abdominal pain, anal bleeding, blood in stool, constipation, diarrhea, nausea and vomiting.        As mentioned in hpi   Genitourinary: Negative for difficulty urinating and hematuria.   Musculoskeletal: Negative for arthralgias and back pain.   Skin: Negative for color change and rash.   Neurological: Negative for dizziness, seizures, syncope, light-headedness and headaches.   Hematological: Negative for adenopathy.   Psychiatric/Behavioral: Negative for confusion. The patient is not nervous/anxious.        Objective     Vitals:    09/01/20 1420   BP: 126/80   Pulse: 81   Temp: 96.8 °F (36 °C)   SpO2: 98%         09/01/20  1420   Weight: 63 kg (139 lb)     Body mass index is " 27.15 kg/m².    Physical Exam   Constitutional: She appears well-developed and well-nourished. No distress.   HENT:   Head: Normocephalic and atraumatic.   Eyes: EOM are normal. No scleral icterus.   Neck: Neck supple. No JVD present.   Cardiovascular: Normal rate, regular rhythm and normal heart sounds.   Pulmonary/Chest: Effort normal and breath sounds normal.   Abdominal: Soft. Bowel sounds are normal. She exhibits no distension. There is no tenderness.   Musculoskeletal: Normal range of motion. She exhibits no deformity.   Neurological: She is alert.   Skin: Skin is warm and dry. No rash noted.   Psychiatric: She has a normal mood and affect. Her behavior is normal.   Vitals reviewed.      Imaging Results (Most Recent)     None          Assessment/Plan     Elva was seen today for colonoscopy.    Diagnoses and all orders for this visit:    Family hx of colon cancer  -     Case Request; Standing    Hx of colonic polyp    Other orders  -     Follow Anesthesia Guidelines / Protocol; Future  -     Obtain Informed Consent; Future  -     polyethylene glycol (Golytely) 236 g solution; Take 4,000 mL by mouth 1 (One) Time for 1 dose. Take as directed per instruction sheet.    Plan for colonoscopy.          Body mass index is 27.15 kg/m².    Patient's Body mass index is 27.15 kg/m². BMI is within normal parameters. No follow-up required..      COLONOSCOPY WITH ANESTHESIA (N/A)  All risks, benefits, alternatives, and indications of colonoscopy procedure have been discussed with the patient. Risks to include perforation of the colon requiring possible surgery or colostomy, risk of bleeding from biopsies or removal of colon tissue, possibility of missing a colon polyp or cancer, or adverse drug reaction.  Benefits to include the diagnosis and management of disease of the colon and rectum. Alternatives to include barium enema, radiographic evaluation, lab testing or no intervention. Pt verbalizes understanding and agrees.        LEONARDO Rausch/transcription disclaimer:  Much of this encounter note is electronic transcription/translation of spoken language to printed text.  The electronic translation of spoken language may be erroneous, or at times, nonsensical words or phrases may be inadvertently transcribed.  Although I have reviewed the note for such errors, some may still exist.

## 2020-09-22 ENCOUNTER — TRANSCRIBE ORDERS (OUTPATIENT)
Dept: ADMINISTRATIVE | Facility: HOSPITAL | Age: 65
End: 2020-09-22

## 2020-09-22 DIAGNOSIS — Z01.818 PRE-OP TESTING: Primary | ICD-10-CM

## 2020-09-26 ENCOUNTER — LAB (OUTPATIENT)
Dept: LAB | Facility: HOSPITAL | Age: 65
End: 2020-09-26

## 2020-09-26 PROCEDURE — C9803 HOPD COVID-19 SPEC COLLECT: HCPCS | Performed by: INTERNAL MEDICINE

## 2020-09-26 PROCEDURE — U0003 INFECTIOUS AGENT DETECTION BY NUCLEIC ACID (DNA OR RNA); SEVERE ACUTE RESPIRATORY SYNDROME CORONAVIRUS 2 (SARS-COV-2) (CORONAVIRUS DISEASE [COVID-19]), AMPLIFIED PROBE TECHNIQUE, MAKING USE OF HIGH THROUGHPUT TECHNOLOGIES AS DESCRIBED BY CMS-2020-01-R: HCPCS | Performed by: INTERNAL MEDICINE

## 2020-09-28 LAB
COVID LABCORP PRIORITY: NORMAL
SARS-COV-2 RNA RESP QL NAA+PROBE: NOT DETECTED

## 2020-09-29 ENCOUNTER — HOSPITAL ENCOUNTER (OUTPATIENT)
Facility: HOSPITAL | Age: 65
Setting detail: HOSPITAL OUTPATIENT SURGERY
Discharge: HOME | End: 2020-09-29
Attending: INTERNAL MEDICINE | Admitting: INTERNAL MEDICINE

## 2020-09-29 ENCOUNTER — ANESTHESIA (OUTPATIENT)
Dept: GASTROENTEROLOGY | Facility: HOSPITAL | Age: 65
End: 2020-09-29

## 2020-09-29 ENCOUNTER — ANESTHESIA EVENT (OUTPATIENT)
Dept: GASTROENTEROLOGY | Facility: HOSPITAL | Age: 65
End: 2020-09-29

## 2020-09-29 VITALS
WEIGHT: 134 LBS | HEIGHT: 60 IN | SYSTOLIC BLOOD PRESSURE: 128 MMHG | TEMPERATURE: 97 F | RESPIRATION RATE: 15 BRPM | DIASTOLIC BLOOD PRESSURE: 71 MMHG | HEART RATE: 97 BPM | OXYGEN SATURATION: 98 % | BODY MASS INDEX: 26.31 KG/M2

## 2020-09-29 DIAGNOSIS — Z80.0 FAMILY HX OF COLON CANCER: ICD-10-CM

## 2020-09-29 PROCEDURE — 25010000002 PROPOFOL 10 MG/ML EMULSION: Performed by: NURSE ANESTHETIST, CERTIFIED REGISTERED

## 2020-09-29 PROCEDURE — 88305 TISSUE EXAM BY PATHOLOGIST: CPT | Performed by: INTERNAL MEDICINE

## 2020-09-29 PROCEDURE — 45385 COLONOSCOPY W/LESION REMOVAL: CPT | Performed by: INTERNAL MEDICINE

## 2020-09-29 PROCEDURE — 88342 IMHCHEM/IMCYTCHM 1ST ANTB: CPT | Performed by: INTERNAL MEDICINE

## 2020-09-29 PROCEDURE — 88341 IMHCHEM/IMCYTCHM EA ADD ANTB: CPT | Performed by: INTERNAL MEDICINE

## 2020-09-29 RX ORDER — ONDANSETRON 2 MG/ML
4 INJECTION INTRAMUSCULAR; INTRAVENOUS ONCE AS NEEDED
Status: DISCONTINUED | OUTPATIENT
Start: 2020-09-29 | End: 2020-09-29 | Stop reason: HOSPADM

## 2020-09-29 RX ORDER — SODIUM CHLORIDE 0.9 % (FLUSH) 0.9 %
10 SYRINGE (ML) INJECTION AS NEEDED
Status: DISCONTINUED | OUTPATIENT
Start: 2020-09-29 | End: 2020-09-29 | Stop reason: HOSPADM

## 2020-09-29 RX ORDER — LIDOCAINE HYDROCHLORIDE 10 MG/ML
0.5 INJECTION, SOLUTION EPIDURAL; INFILTRATION; INTRACAUDAL; PERINEURAL ONCE AS NEEDED
Status: CANCELLED | OUTPATIENT
Start: 2020-09-29

## 2020-09-29 RX ORDER — SODIUM CHLORIDE 9 MG/ML
500 INJECTION, SOLUTION INTRAVENOUS CONTINUOUS PRN
Status: DISCONTINUED | OUTPATIENT
Start: 2020-09-29 | End: 2020-09-29 | Stop reason: HOSPADM

## 2020-09-29 RX ORDER — PROPOFOL 10 MG/ML
VIAL (ML) INTRAVENOUS AS NEEDED
Status: DISCONTINUED | OUTPATIENT
Start: 2020-09-29 | End: 2020-09-29 | Stop reason: SURG

## 2020-09-29 RX ADMIN — SODIUM CHLORIDE 500 ML: 9 INJECTION, SOLUTION INTRAVENOUS at 08:37

## 2020-09-29 RX ADMIN — PROPOFOL 275 MG: 10 INJECTION, EMULSION INTRAVENOUS at 09:01

## 2020-09-29 NOTE — ANESTHESIA PREPROCEDURE EVALUATION
Anesthesia Evaluation     Patient summary reviewed   history of anesthetic complications (narcan 1984):  NPO Solid Status: > 8 hours  NPO Liquid Status: > 2 hours           Airway   Mallampati: I  TM distance: >3 FB  Neck ROM: full  No difficulty expected  Dental - normal exam     Pulmonary - negative pulmonary ROS   Cardiovascular   Exercise tolerance: good (4-7 METS)    (+) pacemaker (medtronic) pacemaker, dysrhythmias, hyperlipidemia,   (-) past MI, CAD, cardiac stents      Neuro/Psych- negative ROS  GI/Hepatic/Renal/Endo    (+)  GERD,    (-) liver disease, no renal disease, diabetes    Musculoskeletal     Abdominal    Substance History      OB/GYN          Other                        Anesthesia Plan    ASA 3     MAC     intravenous induction     Anesthetic plan, all risks, benefits, and alternatives have been provided, discussed and informed consent has been obtained with: patient.

## 2020-09-29 NOTE — ANESTHESIA POSTPROCEDURE EVALUATION
Patient: Elva Gorman    Procedure Summary     Date: 09/29/20 Room / Location: [facility] ENDOSCOPY 6 / [facility] ENDOSCOPY    Anesthesia Start: 0855 Anesthesia Stop: 0919    Procedure: COLONOSCOPY WITH ANESTHESIA (N/A ) Diagnosis:       Family hx of colon cancer      (Family hx of colon cancer [Z80.0])    Surgeon: Edd Gómez MD Provider: Alo Santo CRNA    Anesthesia Type: MAC ASA Status: 3          Anesthesia Type: MAC    Vitals  No vitals data found for the desired time range.          Post Anesthesia Care and Evaluation    Patient location during evaluation: PHASE II  Patient participation: complete - patient participated  Level of consciousness: awake and alert  Pain management: adequate  Airway patency: patent  Anesthetic complications: No anesthetic complications    Cardiovascular status: acceptable  Respiratory status: acceptable  Hydration status: acceptable

## 2020-10-01 LAB
CYTO UR: NORMAL
LAB AP CASE REPORT: NORMAL
LAB AP CLINICAL INFORMATION: NORMAL
PATH REPORT.FINAL DX SPEC: NORMAL
PATH REPORT.GROSS SPEC: NORMAL

## 2023-02-06 ENCOUNTER — OFFICE VISIT (OUTPATIENT)
Dept: GASTROENTEROLOGY | Facility: CLINIC | Age: 68
End: 2023-02-06
Payer: MEDICARE

## 2023-02-06 ENCOUNTER — LAB (OUTPATIENT)
Dept: LAB | Facility: HOSPITAL | Age: 68
End: 2023-02-06
Payer: MEDICARE

## 2023-02-06 VITALS
WEIGHT: 140 LBS | BODY MASS INDEX: 27.48 KG/M2 | TEMPERATURE: 96.6 F | OXYGEN SATURATION: 100 % | SYSTOLIC BLOOD PRESSURE: 130 MMHG | HEART RATE: 71 BPM | HEIGHT: 60 IN | DIASTOLIC BLOOD PRESSURE: 86 MMHG

## 2023-02-06 DIAGNOSIS — R10.13 EPIGASTRIC PAIN: ICD-10-CM

## 2023-02-06 DIAGNOSIS — K21.9 GASTROESOPHAGEAL REFLUX DISEASE, UNSPECIFIED WHETHER ESOPHAGITIS PRESENT: Primary | ICD-10-CM

## 2023-02-06 LAB
CRP SERPL-MCNC: <0.3 MG/DL (ref 0–0.5)
LIPASE SERPL-CCNC: 41 U/L (ref 13–60)

## 2023-02-06 PROCEDURE — 83690 ASSAY OF LIPASE: CPT

## 2023-02-06 PROCEDURE — 86140 C-REACTIVE PROTEIN: CPT

## 2023-02-06 PROCEDURE — 99214 OFFICE O/P EST MOD 30 MIN: CPT | Performed by: NURSE PRACTITIONER

## 2023-02-06 PROCEDURE — 36415 COLL VENOUS BLD VENIPUNCTURE: CPT

## 2023-02-06 RX ORDER — PANTOPRAZOLE SODIUM 40 MG/1
40 TABLET, DELAYED RELEASE ORAL DAILY
Qty: 30 TABLET | Refills: 11 | Status: ON HOLD | OUTPATIENT
Start: 2023-02-06 | End: 2023-02-17 | Stop reason: SDUPTHER

## 2023-02-06 NOTE — H&P (VIEW-ONLY)
[facility] GASTROENTEROLOGY - OFFICE NOTE    2023    Elva Gorman   1955    Primary Physician: Natali Conteh MD    Chief Complaint   Patient presents with   • Heartburn   jacki pain        HISTORY OF PRESENT ILLNESS:     Elva Gorman is a 67 y.o. female presents with heartburn.  Has had in the past but worse x 6 weeks. Has taken omeprazole in the past prn but started taking daily ( am) and has helped some. Still having heartburn daily. Spicy foods can trigger. Elevated head of bed. Evening meal 3 pm to 5 pm. Tries not to eat after 7pm. No snacking. No tobacco or caffeine.  No dysphagia.     Jacki pain  X 2 weeks, intermittent.  No triggers.  No n/v or fever. No pain with eating.  No asa. Takes ibuprofen prn for HA. No arthritis meds.  No history ulcers or black stool.  No weight loss.           UPPER GI ENDOSCOPY (2018 13:19) for jacki pain.      CT Abdomen Pelvis With Contrast (2018 10:01)      COLONOSCOPY (2020 08:53)  Recall 5 years.        Past Medical History:   Diagnosis Date   • Abnormal liver enzymes    • Arthritis    • Colon polyp    • Common bile duct (CBD) stricture    • Depression    • Elevated CA 19-9 level    • GERD (gastroesophageal reflux disease)    • Hyperlipidemia    • IBS (irritable bowel syndrome)    • Nephrolithiasis    • Osteopenia    • Pancreatic divisum    • PONV (postoperative nausea and vomiting)    • Spinal headache        Past Surgical History:   Procedure Laterality Date   • APPENDECTOMY     •  SECTION     • CHOLECYSTECTOMY     • COLONOSCOPY  10/15/2015    normal   • COLONOSCOPY N/A 2020    Procedure: COLONOSCOPY WITH ANESTHESIA;  Surgeon: Edd Gómez MD;  Location: [facility] ENDOSCOPY;  Service: Gastroenterology;  Laterality: N/A;  Pre: Family Hx Colon Cancer, Hx Colon Polyp  Post: Colon Polyps  Dr. Conteh  CO2 Inflation Used   • ENDOSCOPY  10/21/2013    mild gastritis   • ENDOSCOPY N/A 2018    Procedure:  "ESOPHAGOGASTRODUODENOSCOPY WITH ANESTHESIA;  Surgeon: Edd Gómez MD;  Location: [facility] ENDOSCOPY;  Service: Gastroenterology   • HYSTERECTOMY     • BAILEY'S NEUROMA EXCISION     • PACEMAKER IMPLANTATION     • TONSILLECTOMY         Outpatient Medications Marked as Taking for the 2/6/23 encounter (Office Visit) with Lisbeth Lanier APRN   Medication Sig Dispense Refill   • CALCIUM PO Take  by mouth 2 (Two) Times a Day.     • Cholecalciferol (VITAMIN D3 PO) Take  by mouth Daily.     • Melatonin 10 MG tablet Take  by mouth As Needed.     • Omega-3 Fatty Acids (FISH OIL PO) Take  by mouth Daily.     • rosuvastatin (CRESTOR) 20 MG tablet Take 20 mg by mouth Daily.     • [DISCONTINUED] omeprazole (priLOSEC) 20 MG capsule Take 20 mg by mouth Daily.         Allergies   Allergen Reactions   • Celexa [Citalopram Hydrobromide] Other (See Comments)     flushing   • Codeine Other (See Comments)     Heartburn \"chest pain\"       Social History     Socioeconomic History   • Marital status:    Tobacco Use   • Smoking status: Never   • Smokeless tobacco: Never   Substance and Sexual Activity   • Alcohol use: No   • Drug use: No   • Sexual activity: Defer       Family History   Problem Relation Age of Onset   • Colon cancer Father    • Lung cancer Father    • Breast cancer Mother        Review of Systems   Constitutional: Negative for chills, fever and unexpected weight change.   Respiratory: Negative for shortness of breath.    Cardiovascular: Negative for chest pain.   Gastrointestinal: Negative for abdominal distention, anal bleeding, blood in stool, constipation, diarrhea, nausea and vomiting.        Vitals:    02/06/23 0948   BP: 130/86   Pulse: 71   Temp: 96.6 °F (35.9 °C)   SpO2: 100%   Weight: 63.5 kg (140 lb)   Height: 152.4 cm (60\")      Body mass index is 27.34 kg/m².    Physical Exam  Vitals reviewed.   Constitutional:       General: She is not in acute distress.  Cardiovascular:      Rate and Rhythm: Normal " "rate and regular rhythm.      Heart sounds: Normal heart sounds.   Pulmonary:      Effort: Pulmonary effort is normal.      Breath sounds: Normal breath sounds.   Abdominal:      General: Bowel sounds are normal. There is no distension.      Palpations: Abdomen is soft.      Tenderness: There is abdominal tenderness (bobby tenderness).   Skin:     General: Skin is warm and dry.   Neurological:      Mental Status: She is alert.         Results for orders placed or performed during the hospital encounter of 09/29/20   Tissue Pathology Exam    Specimen: Large Intestine; Polyp   Result Value Ref Range    Case Report       Surgical Pathology Report                         Case: [id number]                                  Authorizing Provider:  Edd Gómez MD      Collected:           09/29/2020 09:16 AM          Ordering Location:     [facility]     Received:            09/29/2020 10:29 AM                                 ENDOSCOPY                                                                    Pathologist:           Coral Wei MD                                                        Specimen:    Large Intestine, mid  transverse polyps x2                                                 Clinical Information       Natali Conteh      Final Diagnosis       Large intestine, mid transverse colon polyps x2, polypectomy:  A.  Adenomatous polyp.  B.  Ganglioneuroma.      Gross Description          Specimen #1 is received in a formalin filled container labeled with the patient's name, date of birth, and \"mid transverse polyps x2\".  The specimen consists of 2 red-tan soft tissue polyps measuring 0.3 cm in greatest dimension and 0.4 x 0.4 x 0.3 cm.  The larger polyp is bisected and the entire specimen is submitted in block 1A.      Microscopic Description       Sections of the mid transverse colon polyps x2 reveal the bisected segment of tissue to be an adenomatous polyp of the tubular type.  High-grade " dysplasia is not seen.  There is no evidence of malignancy.    The second fragment of tissue in the container demonstrates colonic mucosa with submucosa demonstrating features of a ganglioneuroma.  Ganglion cells are seen within the mucosa.  Immunohistochemical stains for S100 and synaptophysin highlight the ganglion cells and neural processes.  The controls stain appropriately.  Only single antibody stains were used.  No cocktail stains were used.  There is no evidence of malignancy.             ASSESSMENT AND PLAN    Assessment & Plan     Diagnoses and all orders for this visit:    1. Gastroesophageal reflux disease, unspecified whether esophagitis present (Primary)  -     Case Request; Standing  -     Case Request    2. Epigastric pain  -     Lipase; Future  -     C-reactive Protein; Future    Other orders  -     Implement Anesthesia Orders Day of Procedure; Standing  -     Obtain Informed Consent; Standing  -     pantoprazole (PROTONIX) 40 MG EC tablet; Take 1 tablet by mouth Daily.  Dispense: 30 tablet; Refill: 11        In regards to GERD, recommend strict antireflux precautions.  Stop omeprazole and start Protonix daily. She may use pepcid at night. I discussed non pharmaceutical treatment of gerd.  This includes gradually losing weight to achieve ideal body wt., elevation of the head of bed by 4-6 inches, nothing to eat or drink 3 hours prior to lying down, avoiding tight clothing, stress reduction, tobacco cessation, reduction of alcohol intake, and dietary restrictions (avoiding caffeine, coffee, fatty foods, mints, chocolate, spicy foods and tomato based sauces as much as possible).        In regards to epigastric pain, differential diagnosis discussed.  I do recommend upper endoscopy to further evaluate and she is agreeable.  Also recommend check lipase and CRP. I did review recent cbc and cmp done 2-4-23.  She does have history of pancreatic divisum. If egd negative and still with pain then consider ct  abdomen. I recommend emergency room if worsening or severe symptoms.         ESOPHAGOGASTRODUODENOSCOPY WITH ANESTHESIA (N/A)  Risk, benefits, and alternatives of endoscopy were explained in full.  They understand that there is a risk of bleeding, perforation, and infection.  The risk of perforation goes up with esophageal dilation.  Other options to evaluate UGI complaints could involve barium swallow or UGI series, but these would be diagnostic tests only.  Patient was given time to ask questions.  I answered them to their satisfaction and they are agreeable to proceeding         No follow-ups on file.          There are no Patient Instructions on file for this visit.      Lisbeth Lanier, APRN

## 2023-02-06 NOTE — PROGRESS NOTES
[facility] GASTROENTEROLOGY - OFFICE NOTE    2023    Elva Gorman   1955    Primary Physician: Natali Conteh MD    Chief Complaint   Patient presents with   • Heartburn   jacki pain        HISTORY OF PRESENT ILLNESS:     Elva Gorman is a 67 y.o. female presents with heartburn.  Has had in the past but worse x 6 weeks. Has taken omeprazole in the past prn but started taking daily ( am) and has helped some. Still having heartburn daily. Spicy foods can trigger. Elevated head of bed. Evening meal 3 pm to 5 pm. Tries not to eat after 7pm. No snacking. No tobacco or caffeine.  No dysphagia.     Jacki pain  X 2 weeks, intermittent.  No triggers.  No n/v or fever. No pain with eating.  No asa. Takes ibuprofen prn for HA. No arthritis meds.  No history ulcers or black stool.  No weight loss.           UPPER GI ENDOSCOPY (2018 13:19) for jacki pain.      CT Abdomen Pelvis With Contrast (2018 10:01)      COLONOSCOPY (2020 08:53)  Recall 5 years.        Past Medical History:   Diagnosis Date   • Abnormal liver enzymes    • Arthritis    • Colon polyp    • Common bile duct (CBD) stricture    • Depression    • Elevated CA 19-9 level    • GERD (gastroesophageal reflux disease)    • Hyperlipidemia    • IBS (irritable bowel syndrome)    • Nephrolithiasis    • Osteopenia    • Pancreatic divisum    • PONV (postoperative nausea and vomiting)    • Spinal headache        Past Surgical History:   Procedure Laterality Date   • APPENDECTOMY     •  SECTION     • CHOLECYSTECTOMY     • COLONOSCOPY  10/15/2015    normal   • COLONOSCOPY N/A 2020    Procedure: COLONOSCOPY WITH ANESTHESIA;  Surgeon: Edd Gómez MD;  Location: [facility] ENDOSCOPY;  Service: Gastroenterology;  Laterality: N/A;  Pre: Family Hx Colon Cancer, Hx Colon Polyp  Post: Colon Polyps  Dr. oCnteh  CO2 Inflation Used   • ENDOSCOPY  10/21/2013    mild gastritis   • ENDOSCOPY N/A 2018    Procedure:  "ESOPHAGOGASTRODUODENOSCOPY WITH ANESTHESIA;  Surgeon: Edd Gómez MD;  Location: [facility] ENDOSCOPY;  Service: Gastroenterology   • HYSTERECTOMY     • BAILEY'S NEUROMA EXCISION     • PACEMAKER IMPLANTATION     • TONSILLECTOMY         Outpatient Medications Marked as Taking for the 2/6/23 encounter (Office Visit) with Lisbeth Lanier APRN   Medication Sig Dispense Refill   • CALCIUM PO Take  by mouth 2 (Two) Times a Day.     • Cholecalciferol (VITAMIN D3 PO) Take  by mouth Daily.     • Melatonin 10 MG tablet Take  by mouth As Needed.     • Omega-3 Fatty Acids (FISH OIL PO) Take  by mouth Daily.     • rosuvastatin (CRESTOR) 20 MG tablet Take 20 mg by mouth Daily.     • [DISCONTINUED] omeprazole (priLOSEC) 20 MG capsule Take 20 mg by mouth Daily.         Allergies   Allergen Reactions   • Celexa [Citalopram Hydrobromide] Other (See Comments)     flushing   • Codeine Other (See Comments)     Heartburn \"chest pain\"       Social History     Socioeconomic History   • Marital status:    Tobacco Use   • Smoking status: Never   • Smokeless tobacco: Never   Substance and Sexual Activity   • Alcohol use: No   • Drug use: No   • Sexual activity: Defer       Family History   Problem Relation Age of Onset   • Colon cancer Father    • Lung cancer Father    • Breast cancer Mother        Review of Systems   Constitutional: Negative for chills, fever and unexpected weight change.   Respiratory: Negative for shortness of breath.    Cardiovascular: Negative for chest pain.   Gastrointestinal: Negative for abdominal distention, anal bleeding, blood in stool, constipation, diarrhea, nausea and vomiting.        Vitals:    02/06/23 0948   BP: 130/86   Pulse: 71   Temp: 96.6 °F (35.9 °C)   SpO2: 100%   Weight: 63.5 kg (140 lb)   Height: 152.4 cm (60\")      Body mass index is 27.34 kg/m².    Physical Exam  Vitals reviewed.   Constitutional:       General: She is not in acute distress.  Cardiovascular:      Rate and Rhythm: Normal " "rate and regular rhythm.      Heart sounds: Normal heart sounds.   Pulmonary:      Effort: Pulmonary effort is normal.      Breath sounds: Normal breath sounds.   Abdominal:      General: Bowel sounds are normal. There is no distension.      Palpations: Abdomen is soft.      Tenderness: There is abdominal tenderness (bobby tenderness).   Skin:     General: Skin is warm and dry.   Neurological:      Mental Status: She is alert.         Results for orders placed or performed during the hospital encounter of 09/29/20   Tissue Pathology Exam    Specimen: Large Intestine; Polyp   Result Value Ref Range    Case Report       Surgical Pathology Report                         Case: [id number]                                  Authorizing Provider:  Edd Gómez MD      Collected:           09/29/2020 09:16 AM          Ordering Location:     [facility]     Received:            09/29/2020 10:29 AM                                 ENDOSCOPY                                                                    Pathologist:           Coral Wei MD                                                        Specimen:    Large Intestine, mid  transverse polyps x2                                                 Clinical Information       Natali Conteh      Final Diagnosis       Large intestine, mid transverse colon polyps x2, polypectomy:  A.  Adenomatous polyp.  B.  Ganglioneuroma.      Gross Description          Specimen #1 is received in a formalin filled container labeled with the patient's name, date of birth, and \"mid transverse polyps x2\".  The specimen consists of 2 red-tan soft tissue polyps measuring 0.3 cm in greatest dimension and 0.4 x 0.4 x 0.3 cm.  The larger polyp is bisected and the entire specimen is submitted in block 1A.      Microscopic Description       Sections of the mid transverse colon polyps x2 reveal the bisected segment of tissue to be an adenomatous polyp of the tubular type.  High-grade " dysplasia is not seen.  There is no evidence of malignancy.    The second fragment of tissue in the container demonstrates colonic mucosa with submucosa demonstrating features of a ganglioneuroma.  Ganglion cells are seen within the mucosa.  Immunohistochemical stains for S100 and synaptophysin highlight the ganglion cells and neural processes.  The controls stain appropriately.  Only single antibody stains were used.  No cocktail stains were used.  There is no evidence of malignancy.             ASSESSMENT AND PLAN    Assessment & Plan     Diagnoses and all orders for this visit:    1. Gastroesophageal reflux disease, unspecified whether esophagitis present (Primary)  -     Case Request; Standing  -     Case Request    2. Epigastric pain  -     Lipase; Future  -     C-reactive Protein; Future    Other orders  -     Implement Anesthesia Orders Day of Procedure; Standing  -     Obtain Informed Consent; Standing  -     pantoprazole (PROTONIX) 40 MG EC tablet; Take 1 tablet by mouth Daily.  Dispense: 30 tablet; Refill: 11        In regards to GERD, recommend strict antireflux precautions.  Stop omeprazole and start Protonix daily. She may use pepcid at night. I discussed non pharmaceutical treatment of gerd.  This includes gradually losing weight to achieve ideal body wt., elevation of the head of bed by 4-6 inches, nothing to eat or drink 3 hours prior to lying down, avoiding tight clothing, stress reduction, tobacco cessation, reduction of alcohol intake, and dietary restrictions (avoiding caffeine, coffee, fatty foods, mints, chocolate, spicy foods and tomato based sauces as much as possible).        In regards to epigastric pain, differential diagnosis discussed.  I do recommend upper endoscopy to further evaluate and she is agreeable.  Also recommend check lipase and CRP. I did review recent cbc and cmp done 2-4-23.  She does have history of pancreatic divisum. If egd negative and still with pain then consider ct  abdomen. I recommend emergency room if worsening or severe symptoms.         ESOPHAGOGASTRODUODENOSCOPY WITH ANESTHESIA (N/A)  Risk, benefits, and alternatives of endoscopy were explained in full.  They understand that there is a risk of bleeding, perforation, and infection.  The risk of perforation goes up with esophageal dilation.  Other options to evaluate UGI complaints could involve barium swallow or UGI series, but these would be diagnostic tests only.  Patient was given time to ask questions.  I answered them to their satisfaction and they are agreeable to proceeding         No follow-ups on file.          There are no Patient Instructions on file for this visit.      Lisbeth Lanier, APRN

## 2023-02-17 ENCOUNTER — ANESTHESIA (OUTPATIENT)
Dept: GASTROENTEROLOGY | Facility: HOSPITAL | Age: 68
End: 2023-02-17
Payer: MEDICARE

## 2023-02-17 ENCOUNTER — HOSPITAL ENCOUNTER (OUTPATIENT)
Facility: HOSPITAL | Age: 68
Setting detail: HOSPITAL OUTPATIENT SURGERY
Discharge: HOME | End: 2023-02-17
Attending: INTERNAL MEDICINE | Admitting: INTERNAL MEDICINE
Payer: MEDICARE

## 2023-02-17 ENCOUNTER — ANESTHESIA EVENT (OUTPATIENT)
Dept: GASTROENTEROLOGY | Facility: HOSPITAL | Age: 68
End: 2023-02-17
Payer: MEDICARE

## 2023-02-17 VITALS
TEMPERATURE: 96.9 F | HEART RATE: 60 BPM | SYSTOLIC BLOOD PRESSURE: 130 MMHG | WEIGHT: 139 LBS | DIASTOLIC BLOOD PRESSURE: 73 MMHG | BODY MASS INDEX: 27.29 KG/M2 | OXYGEN SATURATION: 100 % | RESPIRATION RATE: 20 BRPM | HEIGHT: 60 IN

## 2023-02-17 PROCEDURE — 43235 EGD DIAGNOSTIC BRUSH WASH: CPT | Performed by: INTERNAL MEDICINE

## 2023-02-17 PROCEDURE — 25010000002 PROPOFOL 10 MG/ML EMULSION: Performed by: NURSE ANESTHETIST, CERTIFIED REGISTERED

## 2023-02-17 RX ORDER — PANTOPRAZOLE SODIUM 40 MG/1
40 TABLET, DELAYED RELEASE ORAL
Qty: 60 TABLET | Refills: 11 | Status: SHIPPED | OUTPATIENT
Start: 2023-02-17

## 2023-02-17 RX ORDER — SODIUM CHLORIDE 9 MG/ML
500 INJECTION, SOLUTION INTRAVENOUS CONTINUOUS PRN
Status: DISCONTINUED | OUTPATIENT
Start: 2023-02-17 | End: 2023-02-17 | Stop reason: HOSPADM

## 2023-02-17 RX ORDER — OMEPRAZOLE 40 MG/1
40 CAPSULE, DELAYED RELEASE ORAL 2 TIMES DAILY
Qty: 60 CAPSULE | Refills: 11 | Status: SHIPPED | OUTPATIENT
Start: 2023-02-17 | End: 2023-03-19

## 2023-02-17 RX ORDER — LIDOCAINE HYDROCHLORIDE 20 MG/ML
INJECTION, SOLUTION EPIDURAL; INFILTRATION; INTRACAUDAL; PERINEURAL AS NEEDED
Status: DISCONTINUED | OUTPATIENT
Start: 2023-02-17 | End: 2023-02-17 | Stop reason: SURG

## 2023-02-17 RX ORDER — PROPOFOL 10 MG/ML
VIAL (ML) INTRAVENOUS AS NEEDED
Status: DISCONTINUED | OUTPATIENT
Start: 2023-02-17 | End: 2023-02-17 | Stop reason: SURG

## 2023-02-17 RX ORDER — ONDANSETRON 2 MG/ML
4 INJECTION INTRAMUSCULAR; INTRAVENOUS ONCE AS NEEDED
Status: DISCONTINUED | OUTPATIENT
Start: 2023-02-17 | End: 2023-02-17 | Stop reason: HOSPADM

## 2023-02-17 RX ORDER — SODIUM CHLORIDE 0.9 % (FLUSH) 0.9 %
10 SYRINGE (ML) INJECTION AS NEEDED
Status: DISCONTINUED | OUTPATIENT
Start: 2023-02-17 | End: 2023-02-17 | Stop reason: HOSPADM

## 2023-02-17 RX ADMIN — LIDOCAINE HYDROCHLORIDE 50 MG: 20 INJECTION, SOLUTION EPIDURAL; INFILTRATION; INTRACAUDAL; PERINEURAL at 12:13

## 2023-02-17 RX ADMIN — PROPOFOL INJECTABLE EMULSION 70 MG: 10 INJECTION, EMULSION INTRAVENOUS at 12:13

## 2023-02-17 RX ADMIN — SODIUM CHLORIDE 500 ML: 9 INJECTION, SOLUTION INTRAVENOUS at 10:34

## 2023-02-17 RX ADMIN — PROPOFOL INJECTABLE EMULSION 30 MG: 10 INJECTION, EMULSION INTRAVENOUS at 12:15

## 2023-02-17 NOTE — INTERVAL H&P NOTE
H&P reviewed. The patient was examined and there are no changes to the H&P.      She describes attacks of pain that starts in her mid back radiates to her chest.  She can also have pain into her jaw.  Occurs more at nighttime.  It is nonexertional.  She states she exercises on the treadmill almost every day and does not have any pain or symptoms with that.  She has started daily omeprazole and had little bit of help.  She is also elevating the head of her bed.  She presents today for endoscopy exam.  She states she last had cardiac stress test about 2 to 3 years ago

## 2023-02-17 NOTE — ANESTHESIA PREPROCEDURE EVALUATION
Anesthesia Evaluation     Patient summary reviewed   history of anesthetic complications: PONV  NPO Solid Status: > 8 hours  NPO Liquid Status: > 8 hours           Airway   Mallampati: I  TM distance: >3 FB  Neck ROM: full  No difficulty expected  Dental - normal exam     Pulmonary - negative pulmonary ROS   Cardiovascular   Exercise tolerance: good (4-7 METS)    (+) pacemaker (medtronic) pacemaker, dysrhythmias, hyperlipidemia,   (-) past MI, CAD, cardiac stents      Neuro/Psych- negative ROS  GI/Hepatic/Renal/Endo    (+)  GERD,    (-) liver disease, no renal disease, diabetes    Musculoskeletal     Abdominal    Substance History      OB/GYN          Other                          Anesthesia Plan    ASA 3     MAC     intravenous induction     Anesthetic plan, risks, benefits, and alternatives have been provided, discussed and informed consent has been obtained with: patient.

## 2023-02-17 NOTE — ANESTHESIA POSTPROCEDURE EVALUATION
Patient: Elva Gorman    Procedure Summary     Date: 02/17/23 Room / Location: [facility] ENDOSCOPY 5 / [facility] ENDOSCOPY    Anesthesia Start: 1208 Anesthesia Stop: 1221    Procedure: ESOPHAGOGASTRODUODENOSCOPY WITH ANESTHESIA Diagnosis:       Gastroesophageal reflux disease, unspecified whether esophagitis present      (Gastroesophageal reflux disease, unspecified whether esophagitis present [K21.9])    Surgeons: Edd Gómez MD Provider: Luis M Balderrama CRNA    Anesthesia Type: MAC ASA Status: 3          Anesthesia Type: MAC    Vitals  Vitals Value Taken Time   BP     Temp     Pulse 66 02/17/23 1221   Resp     SpO2 97 % 02/17/23 1221   Vitals shown include unvalidated device data.        Post Anesthesia Care and Evaluation    Patient location during evaluation: PHASE II  Patient participation: complete - patient participated  Level of consciousness: awake  Pain score: 0  Pain management: adequate    Airway patency: patent  Anesthetic complications: No anesthetic complications  PONV Status: none  Cardiovascular status: acceptable  Respiratory status: acceptable  Hydration status: acceptable

## 2023-09-03 NOTE — DISCHARGE INSTRUCTIONS
A trial of twice a day PPI, one before breakfast and one before supper, on empty stomach. Then after one month only need one a day in am.    Strict reflux precautions.    Follow with your PCP re chest pain.  
yes

## (undated) DEVICE — CONMED SCOPE SAVER BITE BLOCK, 20X27 MM: Brand: SCOPE SAVER

## (undated) DEVICE — YANKAUER,BULB TIP WITH VENT: Brand: ARGYLE

## (undated) DEVICE — SNAR POLYP SENSATION MICRO OVL 13 240X40

## (undated) DEVICE — THE CHANNEL CLEANING BRUSH IS A NYLON FLEXI BRUSH ATTACHED TO A FLEXIBLE PLASTIC SHEATH DESIGNED TO SAFELY REMOVE DEBRIS FROM FLEXIBLE ENDOSCOPES.

## (undated) DEVICE — FRCP BX RADJAW4 NDL 2.8 240 STD OG

## (undated) DEVICE — CUFF,BP,DISP,1 TUBE,ADULT,HP: Brand: MEDLINE

## (undated) DEVICE — Device: Brand: DEFENDO AIR/WATER/SUCTION AND BIOPSY VALVE

## (undated) DEVICE — THE SINGLE USE ETRAP – POLYP TRAP IS USED FOR SUCTION RETRIEVAL OF ENDOSCOPICALLY REMOVED POLYPS.: Brand: ETRAP

## (undated) DEVICE — ENDOGATOR AUXILIARY WATER JET CONNECTOR: Brand: ENDOGATOR

## (undated) DEVICE — SENSR O2 OXIMAX FNGR A/ 18IN NONSTR

## (undated) DEVICE — TBG SMPL FLTR LINE NASL 02/C02 A/ BX/100

## (undated) DEVICE — MASK,OXYGEN,MED CONC,ADLT,7' TUB, UC: Brand: PENDING